# Patient Record
Sex: FEMALE | URBAN - METROPOLITAN AREA
[De-identification: names, ages, dates, MRNs, and addresses within clinical notes are randomized per-mention and may not be internally consistent; named-entity substitution may affect disease eponyms.]

---

## 2020-10-15 ENCOUNTER — OFFICE VISIT (OUTPATIENT)
Dept: URBAN - METROPOLITAN AREA CLINIC 27 | Facility: CLINIC | Age: 75
End: 2020-10-15
Payer: MEDICARE

## 2020-10-15 PROCEDURE — 92134 CPTRZ OPH DX IMG PST SGM RTA: CPT | Performed by: OPHTHALMOLOGY

## 2020-10-15 ASSESSMENT — INTRAOCULAR PRESSURE
OD: 18
OS: 14

## 2020-10-15 NOTE — IMPRESSION/PLAN
Impression: Retinal edema: H35.81. 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   Treatment as above

## 2020-10-15 NOTE — IMPRESSION/PLAN
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 OCT shows stable IRF/SRF OU, s/p Avastin 6/2/2020. IVFA from 2/20/18 showed leakage temporal to the fovea OU and right-angled venules OU, both classic for JFT. Recommend additional Avastin OU, R/B/A discussed, pt elects to proceed. S/P MRX with Dr. Jerrica Roper RTC 8 weeks OCT OU reeval, DFE OU (semiannual)

## 2020-12-10 ENCOUNTER — OFFICE VISIT (OUTPATIENT)
Dept: URBAN - METROPOLITAN AREA CLINIC 27 | Facility: CLINIC | Age: 75
End: 2020-12-10
Payer: MEDICARE

## 2020-12-10 DIAGNOSIS — Z96.1 PRESENCE OF INTRAOCULAR LENS: ICD-10-CM

## 2020-12-10 PROCEDURE — 92014 COMPRE OPH EXAM EST PT 1/>: CPT | Performed by: OPHTHALMOLOGY

## 2020-12-10 PROCEDURE — 92134 CPTRZ OPH DX IMG PST SGM RTA: CPT | Performed by: OPHTHALMOLOGY

## 2020-12-10 ASSESSMENT — INTRAOCULAR PRESSURE
OS: 13
OD: 16

## 2020-12-10 NOTE — IMPRESSION/PLAN
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
s/p Avastin OU, last 10/15/2020 Plan: Both eyes are due for full dilated semiannual exam today. 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 OCT shows stable IRF/SRF OU, s/p Avastin 10/15/2020. IVFA from 2/20/18 showed leakage temporal to the fovea OU and right-angled venules OU, both classic for JFT. Recommend additional Avastin OU, R/B/A discussed, pt elects to proceed with treatment OU. S/P MRx with Dr. Teto Holliday RTC 8 weeks OCT OU reeval, Avastin

## 2021-02-04 ENCOUNTER — OFFICE VISIT (OUTPATIENT)
Dept: URBAN - METROPOLITAN AREA CLINIC 27 | Facility: CLINIC | Age: 76
End: 2021-02-04
Payer: MEDICARE

## 2021-02-04 PROCEDURE — 92134 CPTRZ OPH DX IMG PST SGM RTA: CPT | Performed by: OPHTHALMOLOGY

## 2021-02-04 ASSESSMENT — INTRAOCULAR PRESSURE
OD: 21
OS: 18

## 2021-02-04 NOTE — IMPRESSION/PLAN
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
s/p Avastin OU, last 12/10/2020
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 OCT shows stable IRF/SRF OU, s/p Avastin 12/10/2020. IVFA from 2/20/18 showed leakage temporal to the fovea OU and right-angled venules OU, both classic for JFT. Recommend additional Avastin OU, R/B/A discussed, pt elects to proceed with treatment OU. S/P MRx with Dr. Kamla Dudley RTC 8 weeks OCT OU reeval, Avastin

## 2021-04-01 ENCOUNTER — OFFICE VISIT (OUTPATIENT)
Dept: URBAN - METROPOLITAN AREA CLINIC 27 | Facility: CLINIC | Age: 76
End: 2021-04-01
Payer: MEDICARE

## 2021-04-01 PROCEDURE — 92134 CPTRZ OPH DX IMG PST SGM RTA: CPT | Performed by: OPHTHALMOLOGY

## 2021-04-01 ASSESSMENT — INTRAOCULAR PRESSURE
OS: 14
OD: 13

## 2021-04-01 NOTE — IMPRESSION/PLAN
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
s/p Avastin OU, last 2/4/21 Plan: Dilated exam shows type 2 juxtafoveal telangiectasia OU. OCT continues to show stable IRF/SRF OU, s/p Avastin 2/4/21. IVFA from 2/20/18 showed leakage temporal to the fovea OU and right-angled venules OU, both classic for JFT. Recommend additional Avastin OU, R/B/A discussed, pt elects to proceed with treatment OU. S/P MRx with Dr. Aries Davila RTC 8 weeks OCT OU reeval, Avastin, do not dilate OU

## 2021-05-27 ENCOUNTER — OFFICE VISIT (OUTPATIENT)
Dept: URBAN - METROPOLITAN AREA CLINIC 27 | Facility: CLINIC | Age: 76
End: 2021-05-27
Payer: MEDICARE

## 2021-05-27 PROCEDURE — 92134 CPTRZ OPH DX IMG PST SGM RTA: CPT | Performed by: OPHTHALMOLOGY

## 2021-05-27 ASSESSMENT — INTRAOCULAR PRESSURE
OS: 12
OD: 13

## 2021-05-27 NOTE — IMPRESSION/PLAN
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 OCT demonstrates stable IRF/SRF OU, s/p Avastin 2/4/21. IVFA from 2/20/18 showed leakage temporal to the fovea OU and right-angled venules OU, both classic for JFT. Recommend additional Avastin OU, R/B/A discussed, pt elects to proceed with treatment OU. S/P MRx with Dr. Rosa Eldridge RT 10 weeks OCT OU reeval, Avastin, dilate OU (semiannual)

## 2021-08-10 ENCOUNTER — OFFICE VISIT (OUTPATIENT)
Dept: URBAN - METROPOLITAN AREA CLINIC 27 | Facility: CLINIC | Age: 76
End: 2021-08-10
Payer: MEDICARE

## 2021-08-10 PROCEDURE — 92134 CPTRZ OPH DX IMG PST SGM RTA: CPT | Performed by: OPHTHALMOLOGY

## 2021-08-10 PROCEDURE — 92014 COMPRE OPH EXAM EST PT 1/>: CPT | Performed by: OPHTHALMOLOGY

## 2021-08-10 ASSESSMENT — INTRAOCULAR PRESSURE
OD: 14
OS: 15

## 2021-08-10 NOTE — IMPRESSION/PLAN
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
s/p Avastin OU, last 5/27/21 Plan: Both eyes are due for a full semiannual dilated exam today. 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 OCT demonstrates stable IRF/SRF OU, s/p Avastin 2/4/21. IVFA from 2/20/18 showed leakage temporal to the fovea OU and right-angled venules OU, both classic for JFT. Recommend additional Avastin OU, R/B/A discussed, pt elects to proceed with treatment OU. No complications encountered RTC 10 weeks OCT OU reeval, Avastin

## 2021-10-19 ENCOUNTER — OFFICE VISIT (OUTPATIENT)
Dept: URBAN - METROPOLITAN AREA CLINIC 27 | Facility: CLINIC | Age: 76
End: 2021-10-19
Payer: MEDICARE

## 2021-10-19 DIAGNOSIS — H35.81 RETINAL EDEMA: ICD-10-CM

## 2021-10-19 PROCEDURE — 92134 CPTRZ OPH DX IMG PST SGM RTA: CPT | Performed by: OPHTHALMOLOGY

## 2021-10-19 ASSESSMENT — INTRAOCULAR PRESSURE
OD: 15
OS: 16

## 2021-10-19 NOTE — IMPRESSION/PLAN
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
s/p Avastin OU, last 08/10/21
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 OCT continues to reveal stable IRF/SRF OU, s/p Avastin 08/10/21. IVFA from 2/20/18 showed leakage temporal to the fovea OU and right-angled venules OU, both classic for JFT. Recommend additional Avastin OU with extension of treatment interval as tolerated. R/B/A discussed, pt elects to proceed with treatment OU. No complications encountered RTC 12 weeks OCT OU reeval, Avastin

## 2022-01-11 ENCOUNTER — OFFICE VISIT (OUTPATIENT)
Dept: URBAN - METROPOLITAN AREA CLINIC 27 | Facility: CLINIC | Age: 77
End: 2022-01-11
Payer: MEDICARE

## 2022-01-11 PROCEDURE — 92134 CPTRZ OPH DX IMG PST SGM RTA: CPT | Performed by: OPHTHALMOLOGY

## 2022-01-11 PROCEDURE — 99213 OFFICE O/P EST LOW 20 MIN: CPT | Performed by: OPHTHALMOLOGY

## 2022-01-11 ASSESSMENT — INTRAOCULAR PRESSURE
OD: 14
OS: 12

## 2022-01-11 NOTE — IMPRESSION/PLAN
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
s/p Avastin OU, last 10/19/21 Plan: Exam shows stable IMT OU. OCT continues to reveal stable IRF/SRF OU, s/p Avastin OU 10/19/21. IVFA from 2/20/18 showed leakage temporal to the fovea OU and right-angled venules OU, both classic for JFT. Recommend additional Avastin OU, maintain q12 week treatment interval. R/B/A discussed, pt elects to proceed with treatment OU. No complications encountered RTC 12 weeks OCT OU reeval, DFE OU (semiannual), dilate only q6 months

## 2022-04-05 ENCOUNTER — OFFICE VISIT (OUTPATIENT)
Dept: URBAN - METROPOLITAN AREA CLINIC 27 | Facility: CLINIC | Age: 77
End: 2022-04-05
Payer: MEDICARE

## 2022-04-05 DIAGNOSIS — H35.073 RETINAL TELANGIECTASIS, BILATERAL: Primary | ICD-10-CM

## 2022-04-05 PROCEDURE — 99213 OFFICE O/P EST LOW 20 MIN: CPT | Performed by: OPHTHALMOLOGY

## 2022-04-05 PROCEDURE — 92134 CPTRZ OPH DX IMG PST SGM RTA: CPT | Performed by: OPHTHALMOLOGY

## 2022-04-05 ASSESSMENT — INTRAOCULAR PRESSURE
OS: 16
OD: 18

## 2022-04-05 NOTE — IMPRESSION/PLAN
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
s/p Avastin OU, last 01/11/22 Plan: Exam shows stable IMT OU. OCT demonstrates improving IRF/SRF OU, s/p Avastin OU 1/11/22. IVFA from 2/20/18 showed leakage temporal to the fovea OU and right-angled venules OU, both classic for JFT. Recommend additional Avastin OU, maintain q12 week treatment interval. R/B/A discussed, pt elects to proceed with treatment OU. No complications encountered RTC 12 weeks OCT OU reeval

## 2022-06-28 ENCOUNTER — OFFICE VISIT (OUTPATIENT)
Dept: URBAN - METROPOLITAN AREA CLINIC 27 | Facility: CLINIC | Age: 77
End: 2022-06-28
Payer: MEDICARE

## 2022-06-28 DIAGNOSIS — Z96.1 PRESENCE OF INTRAOCULAR LENS: ICD-10-CM

## 2022-06-28 DIAGNOSIS — H35.073 RETINAL TELANGIECTASIS, BILATERAL: Primary | ICD-10-CM

## 2022-06-28 DIAGNOSIS — H35.81 RETINAL EDEMA: ICD-10-CM

## 2022-06-28 PROCEDURE — 92134 CPTRZ OPH DX IMG PST SGM RTA: CPT | Performed by: OPHTHALMOLOGY

## 2022-06-28 PROCEDURE — 99213 OFFICE O/P EST LOW 20 MIN: CPT | Performed by: OPHTHALMOLOGY

## 2022-06-28 ASSESSMENT — INTRAOCULAR PRESSURE
OS: 12
OD: 14

## 2022-06-28 NOTE — IMPRESSION/PLAN
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
s/p Avastin OU, last 04/05/22
last DFE OU 04/05/22 Plan: Exam demonstrates IMT OU, stable appearance. OCT reveals persistent IRF/SRF OU, s/p Avastin OU 04/05/22. IVFA from 2/20/18 showed leakage temporal to the fovea OU and right-angled venules OU, both classic for JFT. Recommend additional Avastin OU, maintain q12 week treatment interval. R/B/A discussed, pt elects to proceed with treatment OU. No complications encountered RTC 12 weeks OCT OU reeval

## 2022-09-20 ENCOUNTER — OFFICE VISIT (OUTPATIENT)
Dept: URBAN - METROPOLITAN AREA CLINIC 27 | Facility: CLINIC | Age: 77
End: 2022-09-20
Payer: MEDICARE

## 2022-09-20 DIAGNOSIS — H35.81 RETINAL EDEMA: ICD-10-CM

## 2022-09-20 DIAGNOSIS — H35.073 RETINAL TELANGIECTASIS, BILATERAL: Primary | ICD-10-CM

## 2022-09-20 DIAGNOSIS — Z96.1 PRESENCE OF INTRAOCULAR LENS: ICD-10-CM

## 2022-09-20 PROCEDURE — 99213 OFFICE O/P EST LOW 20 MIN: CPT | Performed by: OPHTHALMOLOGY

## 2022-09-20 PROCEDURE — 92134 CPTRZ OPH DX IMG PST SGM RTA: CPT | Performed by: OPHTHALMOLOGY

## 2022-09-20 ASSESSMENT — INTRAOCULAR PRESSURE
OS: 14
OD: 18

## 2022-09-20 NOTE — IMPRESSION/PLAN
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
s/p Avastin OU, last 06/28/22
last DFE OU 06/28/22 Plan: Exam demonstrates stable IMT OU. OCT reveals persistent, stable IRF/SRF OU, s/p Avastin OU 04/05/22. IVFA from 2/20/18 showed leakage temporal to the fovea OU and right-angled venules OU, both classic for JFT. Recommend additional Avastin OU, maintain q12 week treatment interval. R/B/A discussed, pt elects to proceed with treatment OU. No complications encountered RTC 12 weeks DFE/OCT OU reeval

## 2022-12-13 ENCOUNTER — OFFICE VISIT (OUTPATIENT)
Dept: URBAN - METROPOLITAN AREA CLINIC 27 | Facility: CLINIC | Age: 77
End: 2022-12-13
Payer: MEDICARE

## 2022-12-13 DIAGNOSIS — H35.073 RETINAL TELANGIECTASIS, BILATERAL: Primary | ICD-10-CM

## 2022-12-13 DIAGNOSIS — Z96.1 PRESENCE OF INTRAOCULAR LENS: ICD-10-CM

## 2022-12-13 DIAGNOSIS — H35.81 RETINAL EDEMA: ICD-10-CM

## 2022-12-13 PROCEDURE — 92134 CPTRZ OPH DX IMG PST SGM RTA: CPT | Performed by: OPHTHALMOLOGY

## 2022-12-13 PROCEDURE — 99213 OFFICE O/P EST LOW 20 MIN: CPT | Performed by: OPHTHALMOLOGY

## 2022-12-13 ASSESSMENT — INTRAOCULAR PRESSURE
OS: 12
OD: 14

## 2022-12-13 NOTE — IMPRESSION/PLAN
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
s/p Avastin OU, last 09/20/22 Plan: Exam demonstrates stable IMT OU. OCT demonstrates stable IRF/SRF OU, s/p Avastin OU 09/20/22. IVFA from 2/20/18 showed leakage temporal to the fovea OU and right-angled venules OU, both classic for JFT. Recommend additional Avastin OU, maintain q12 week treatment interval. R/B/A discussed, pt elects to proceed with treatment OU. No complications encountered RTC 12 weeks DFE/OCT OU reeval

## 2023-03-07 ENCOUNTER — OFFICE VISIT (OUTPATIENT)
Dept: URBAN - METROPOLITAN AREA CLINIC 27 | Facility: CLINIC | Age: 78
End: 2023-03-07
Payer: MEDICARE

## 2023-03-07 DIAGNOSIS — H35.81 RETINAL EDEMA: ICD-10-CM

## 2023-03-07 DIAGNOSIS — Z96.1 PRESENCE OF INTRAOCULAR LENS: ICD-10-CM

## 2023-03-07 DIAGNOSIS — H35.073 RETINAL TELANGIECTASIS, BILATERAL: Primary | ICD-10-CM

## 2023-03-07 PROCEDURE — 92134 CPTRZ OPH DX IMG PST SGM RTA: CPT | Performed by: OPHTHALMOLOGY

## 2023-03-07 PROCEDURE — 99213 OFFICE O/P EST LOW 20 MIN: CPT | Performed by: OPHTHALMOLOGY

## 2023-03-07 ASSESSMENT — INTRAOCULAR PRESSURE
OS: 14
OD: 16

## 2023-03-07 NOTE — IMPRESSION/PLAN
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
s/p Avastin OU, last 12/13/22 Plan: Exam demonstrates stable IMT OU. OCT demonstrates mild, stable IRF/SRF OU, s/p Avastin OU 12/13/22. IVFA from 2/20/18 showed leakage temporal to the fovea OU and right-angled venules OU, both classic for IMT. Recommend additional Avastin OU, maintain q12 week treatment interval. R/B/A discussed, pt elects to proceed with treatment OU. No complications encountered RTC 12 weeks DFE/OCT OU reeval

## 2023-05-30 ENCOUNTER — OFFICE VISIT (OUTPATIENT)
Dept: URBAN - METROPOLITAN AREA CLINIC 27 | Facility: CLINIC | Age: 78
End: 2023-05-30
Payer: MEDICARE

## 2023-05-30 DIAGNOSIS — H35.073 RETINAL TELANGIECTASIS, BILATERAL: Primary | ICD-10-CM

## 2023-05-30 DIAGNOSIS — Z96.1 PRESENCE OF INTRAOCULAR LENS: ICD-10-CM

## 2023-05-30 DIAGNOSIS — H35.81 RETINAL EDEMA: ICD-10-CM

## 2023-05-30 PROCEDURE — 99213 OFFICE O/P EST LOW 20 MIN: CPT | Performed by: OPHTHALMOLOGY

## 2023-05-30 PROCEDURE — 92134 CPTRZ OPH DX IMG PST SGM RTA: CPT | Performed by: OPHTHALMOLOGY

## 2023-05-30 ASSESSMENT — INTRAOCULAR PRESSURE
OD: 17
OS: 15

## 2023-05-30 NOTE — IMPRESSION/PLAN
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
s/p Avastin OU, last 03/07/23 Plan: Exam and OCT show IMT OU with mild, stable IRF/SRF OU, s/p Avastin OU 03/07/23. IVFA from 2/20/18 showed leakage temporal to the fovea OU and right-angled venules OU, both classic for IMT. Recommend additional Avastin OU, continue q12 week treatment interval. R/B/A discussed, pt elects to proceed with treatment OU. No complications encountered RTC 12 weeks DFE/OCT OU reeval

## 2023-08-22 ENCOUNTER — OFFICE VISIT (OUTPATIENT)
Dept: URBAN - METROPOLITAN AREA CLINIC 27 | Facility: CLINIC | Age: 78
End: 2023-08-22
Payer: MEDICARE

## 2023-08-22 DIAGNOSIS — Z96.1 PRESENCE OF INTRAOCULAR LENS: ICD-10-CM

## 2023-08-22 DIAGNOSIS — H35.81 RETINAL EDEMA: ICD-10-CM

## 2023-08-22 DIAGNOSIS — H35.073 RETINAL TELANGIECTASIS, BILATERAL: Primary | ICD-10-CM

## 2023-08-22 PROCEDURE — 92134 CPTRZ OPH DX IMG PST SGM RTA: CPT | Performed by: OPHTHALMOLOGY

## 2023-08-22 PROCEDURE — 99213 OFFICE O/P EST LOW 20 MIN: CPT | Performed by: OPHTHALMOLOGY

## 2023-08-22 ASSESSMENT — INTRAOCULAR PRESSURE
OD: 15
OS: 15

## 2023-12-12 ENCOUNTER — OFFICE VISIT (OUTPATIENT)
Dept: URBAN - METROPOLITAN AREA CLINIC 27 | Facility: CLINIC | Age: 78
End: 2023-12-12
Payer: MEDICARE

## 2023-12-12 DIAGNOSIS — Z96.1 PRESENCE OF INTRAOCULAR LENS: ICD-10-CM

## 2023-12-12 DIAGNOSIS — H35.073 RETINAL TELANGIECTASIS, BILATERAL: Primary | ICD-10-CM

## 2023-12-12 DIAGNOSIS — H35.81 RETINAL EDEMA: ICD-10-CM

## 2023-12-12 PROCEDURE — 99213 OFFICE O/P EST LOW 20 MIN: CPT | Performed by: OPHTHALMOLOGY

## 2023-12-12 PROCEDURE — 92134 CPTRZ OPH DX IMG PST SGM RTA: CPT | Performed by: OPHTHALMOLOGY

## 2023-12-12 ASSESSMENT — INTRAOCULAR PRESSURE
OS: 13
OD: 15

## 2024-03-05 ENCOUNTER — OFFICE VISIT (OUTPATIENT)
Dept: URBAN - METROPOLITAN AREA CLINIC 27 | Facility: CLINIC | Age: 79
End: 2024-03-05
Payer: MEDICARE

## 2024-03-05 DIAGNOSIS — H35.81 RETINAL EDEMA: ICD-10-CM

## 2024-03-05 DIAGNOSIS — H35.073 RETINAL TELANGIECTASIS, BILATERAL: Primary | ICD-10-CM

## 2024-03-05 DIAGNOSIS — Z96.1 PRESENCE OF INTRAOCULAR LENS: ICD-10-CM

## 2024-03-05 PROCEDURE — 92134 CPTRZ OPH DX IMG PST SGM RTA: CPT | Performed by: OPHTHALMOLOGY

## 2024-03-05 PROCEDURE — 99213 OFFICE O/P EST LOW 20 MIN: CPT | Performed by: OPHTHALMOLOGY

## 2024-03-05 ASSESSMENT — INTRAOCULAR PRESSURE
OS: 17
OD: 18

## 2024-06-11 ENCOUNTER — OFFICE VISIT (OUTPATIENT)
Dept: URBAN - METROPOLITAN AREA CLINIC 27 | Facility: CLINIC | Age: 79
End: 2024-06-11
Payer: MEDICARE

## 2024-06-11 DIAGNOSIS — H35.81 RETINAL EDEMA: ICD-10-CM

## 2024-06-11 DIAGNOSIS — Z96.1 PRESENCE OF INTRAOCULAR LENS: ICD-10-CM

## 2024-06-11 DIAGNOSIS — H35.073 RETINAL TELANGIECTASIS, BILATERAL: Primary | ICD-10-CM

## 2024-06-11 PROCEDURE — 92134 CPTRZ OPH DX IMG PST SGM RTA: CPT | Performed by: OPHTHALMOLOGY

## 2024-06-11 PROCEDURE — 99214 OFFICE O/P EST MOD 30 MIN: CPT | Performed by: OPHTHALMOLOGY

## 2024-06-11 ASSESSMENT — INTRAOCULAR PRESSURE
OS: 18
OD: 15

## 2024-08-06 ENCOUNTER — OFFICE VISIT (OUTPATIENT)
Dept: URBAN - METROPOLITAN AREA CLINIC 27 | Facility: CLINIC | Age: 79
End: 2024-08-06
Payer: MEDICARE

## 2024-08-06 DIAGNOSIS — H35.073 RETINAL TELANGIECTASIS, BILATERAL: Primary | ICD-10-CM

## 2024-08-06 DIAGNOSIS — H35.81 RETINAL EDEMA: ICD-10-CM

## 2024-08-06 PROCEDURE — 92134 CPTRZ OPH DX IMG PST SGM RTA: CPT | Performed by: OPHTHALMOLOGY

## 2024-08-06 ASSESSMENT — INTRAOCULAR PRESSURE
OD: 12
OS: 12

## 2024-10-29 ENCOUNTER — OFFICE VISIT (OUTPATIENT)
Dept: URBAN - METROPOLITAN AREA CLINIC 27 | Facility: CLINIC | Age: 79
End: 2024-10-29
Payer: MEDICARE

## 2024-10-29 DIAGNOSIS — Z96.1 PRESENCE OF INTRAOCULAR LENS: ICD-10-CM

## 2024-10-29 DIAGNOSIS — H35.073 RETINAL TELANGIECTASIS, BILATERAL: Primary | ICD-10-CM

## 2024-10-29 DIAGNOSIS — H35.81 RETINAL EDEMA: ICD-10-CM

## 2024-10-29 PROCEDURE — 92134 CPTRZ OPH DX IMG PST SGM RTA: CPT | Performed by: OPHTHALMOLOGY

## 2024-10-29 PROCEDURE — 99213 OFFICE O/P EST LOW 20 MIN: CPT | Performed by: OPHTHALMOLOGY

## 2024-10-29 ASSESSMENT — INTRAOCULAR PRESSURE
OS: 14
OD: 11

## 2025-01-21 ENCOUNTER — OFFICE VISIT (OUTPATIENT)
Dept: URBAN - METROPOLITAN AREA CLINIC 27 | Facility: CLINIC | Age: 80
End: 2025-01-21
Payer: MEDICARE

## 2025-01-21 DIAGNOSIS — H35.81 RETINAL EDEMA: ICD-10-CM

## 2025-01-21 DIAGNOSIS — Z96.1 PRESENCE OF INTRAOCULAR LENS: ICD-10-CM

## 2025-01-21 DIAGNOSIS — H35.073 RETINAL TELANGIECTASIS, BILATERAL: Primary | ICD-10-CM

## 2025-01-21 PROCEDURE — 92134 CPTRZ OPH DX IMG PST SGM RTA: CPT | Performed by: OPHTHALMOLOGY

## 2025-01-21 PROCEDURE — 99213 OFFICE O/P EST LOW 20 MIN: CPT | Performed by: OPHTHALMOLOGY

## 2025-01-21 ASSESSMENT — INTRAOCULAR PRESSURE
OD: 14
OS: 11

## 2025-04-23 ENCOUNTER — OFFICE VISIT (OUTPATIENT)
Dept: URBAN - METROPOLITAN AREA CLINIC 27 | Facility: CLINIC | Age: 80
End: 2025-04-23
Payer: MEDICARE

## 2025-04-23 DIAGNOSIS — Z96.1 PRESENCE OF INTRAOCULAR LENS: ICD-10-CM

## 2025-04-23 DIAGNOSIS — H35.073 RETINAL TELANGIECTASIS, BILATERAL: Primary | ICD-10-CM

## 2025-04-23 DIAGNOSIS — H35.81 RETINAL EDEMA: ICD-10-CM

## 2025-04-23 PROCEDURE — 99213 OFFICE O/P EST LOW 20 MIN: CPT | Performed by: OPHTHALMOLOGY

## 2025-04-23 PROCEDURE — 92134 CPTRZ OPH DX IMG PST SGM RTA: CPT | Performed by: OPHTHALMOLOGY

## 2025-04-23 ASSESSMENT — INTRAOCULAR PRESSURE
OD: 14
OS: 14